# Patient Record
Sex: FEMALE | Race: WHITE
[De-identification: names, ages, dates, MRNs, and addresses within clinical notes are randomized per-mention and may not be internally consistent; named-entity substitution may affect disease eponyms.]

---

## 2017-09-27 ENCOUNTER — HOSPITAL ENCOUNTER (OUTPATIENT)
Dept: HOSPITAL 80 - FIMAGING | Age: 67
End: 2017-09-27
Attending: GENERAL ACUTE CARE HOSPITAL
Payer: COMMERCIAL

## 2017-09-27 DIAGNOSIS — Z12.31: Primary | ICD-10-CM

## 2017-09-27 PROCEDURE — G0202 SCR MAMMO BI INCL CAD: HCPCS

## 2019-01-23 ENCOUNTER — HOSPITAL ENCOUNTER (EMERGENCY)
Dept: HOSPITAL 80 - FED | Age: 69
LOS: 1 days | Discharge: HOME | End: 2019-01-24
Payer: COMMERCIAL

## 2019-01-23 VITALS — SYSTOLIC BLOOD PRESSURE: 123 MMHG | DIASTOLIC BLOOD PRESSURE: 76 MMHG

## 2019-01-23 DIAGNOSIS — S42.211A: Primary | ICD-10-CM
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
## 2019-01-23 PROCEDURE — 99283 EMERGENCY DEPT VISIT LOW MDM: CPT

## 2019-01-23 PROCEDURE — A4565 SLINGS: HCPCS

## 2019-01-23 PROCEDURE — 73060 X-RAY EXAM OF HUMERUS: CPT

## 2019-01-23 NOTE — EDPHY
H & P


Stated Complaint: SLIPPED AND FELL ONTO R ARM


Time Seen by Provider: 01/23/19 22:15


HPI/ROS: 


HPI:  This is a 68-year-old female who presents with





Chief Complaint: SLIPPED AND FELL ONTO R ARM





Location:  Right posterior arm


Quality:  Injury


Duration:  1 hr prior to arrival


Signs and Symptoms:  No bleeding, no radiation, no numbness, no weakness, no 

tingling, no incontinence, + decreased range of motion, no swelling, + pain, no 

fever


Timing:  Acute, constant


Severity:  Moderate


Context:  Patient is right-hand dominant, presents with walking outside over 

home onto the sidewalk and slipping on a patch of ice at the curb.  Patient 

reports that she fell directly on her mid upper arm.  She reports that she felt 

immediate, constant, radiating pain up into her shoulder and down into her 

elbow.  Denies LOC/head injury/neck pain/dizziness/nausea/vomiting/amnesia.  

She was ambulatory at the scene and went back inside and took 2 Tylenol for 

pain relief.  She then drove herself to the emergency room.  Does not take 

blood thinners.


Modifying Factors:  Tylenol.





Comment: 








ROS:  A comprehensive 10 system review of systems is otherwise negative aside 

from elements mentioned in the history of present illness. 








MEDICAL/SURGICAL/SOCIAL HISTORY: 


Medical history:  Generally healthy.  Does not take any regular medications.


Surgical history:  Denies


Social history:  Former smoker.








CONSTITUTIONAL:  Polite and cooperative elderly white female, awake and alert, 

no obvious distress


HEENT: Atraumatic and normocephalic.


NECK: supple, no midline tenderness, flexion 45 degrees, extension 45 degrees, 

right and left lateral flexion 45 degrees. No meningismus.


Cardiovascular: Normal S1/S2, regular rate, regular rhythm, without murmur rub 

or gallop.


PULMONARY/CHEST:  Symmetrical and nontender. no crepitus. Clear to auscultation 

bilaterally. Good air movement. No accessory muscle usage.


ABDOMEN:  Soft, nondistended, nontender, no ecchymosis.


PELVIC: no pain with rocking; bilateral hips flexion 125 degrees, extension 30 

degrees, with no pain internal rotation and no pain external rotation.


BACK:  No midline tenderness, no paraspinous spasm, deep tendon reflexes 2/2, 

no pain with straight leg raise, No foot drop.  Achilles reflexes are equal 

bilaterally.  Able to walk on heels and toes without difficulty.


EXTREMITIES:  2/2 pulses, strength 5/5, right SHOULDER:  Patient keeps her 

right arm with the elbow flexed at 90 across her chest.  She refuses to move 

the arm secondary to pain.  Wrist and fingers have full range of motion.  DIP/

PIP/MCP flexion/extension intact with good light touch sensation. no deformities

, no clubbing, no cyanosis or edema.


NEUROLOGICAL: no focal neuro deficits.  GCS 15.  Light touch sensation intact.


SKIN: Warm and dry, no erythema. no rash.  Good capillary refill.   





Source: Patient


Exam Limitations: No limitations





- Personal History


Current Tetanus/Diphtheria Vaccine: Yes


Current Tetanus Diphtheria and Acellular Pertussis (TDAP): Yes





- Medical/Surgical History


Hx Asthma: No


Hx Chronic Respiratory Disease: No


Hx Diabetes: No


Hx Cardiac Disease: No


Hx Renal Disease: No


Hx Cirrhosis: No


Hx Alcoholism: No


Hx HIV/AIDS: No


Hx Splenectomy or Spleen Trauma: No


Other PMH: DENIES





- Social History


Smoking Status: Former smoker


Constitutional: 


 Initial Vital Signs











Temperature (C)  36.3 C   01/23/19 21:59


 


Heart Rate  78   01/23/19 21:59


 


Respiratory Rate  18   01/23/19 21:59


 


Blood Pressure  133/76 H  01/23/19 21:59


 


O2 Sat (%)  99   01/23/19 21:59








 











O2 Delivery Mode               Room Air














Allergies/Adverse Reactions: 


 





gluten Allergy (Verified 01/23/19 22:01)


 








Home Medications: 














 Medication  Instructions  Recorded


 


SUMAtriptan [Imitrex 50 MG (*)] 25 - 100 mg PO DAILY PRN 07/09/15


 


Zolpidem Tartrate [Ambien 10 mg] 3 mg PO HS 07/09/15


 


oxyCODONE/APAP 5/325 [Percocet 1 - 2 tab PO Q4H PRN #10 tab 01/23/19





5/325 (*)]  














Medical Decision Making





- Diagnostics


Imaging Results: 


 Imaging Impressions





Humerus X-Ray  01/23/19 22:02


Impression: Transverse minimally displaced fracture of the surgical neck of the 

humerus.


 


 











Procedures: 


Procedure:  Splint placement.





A right sling was applied by the Emergency Room technician.  After application 

of the splint I returned and re-examined the patient.  The splint was 

adequately immobilizing the joint and distal to the splint the patient's 

circulation and sensation was intact.





ED Course/Re-evaluation: 


Vital signs reviewed and stable upon arrival.


Right humerus fracture ordered and oxycodone 10 mg given.


Patient reports that she did not hit her head and does not take blood thinners.

  


I suggested a head CT or cervical CT scan and patient says she has no pain and 

did not have an injury.  


Right humerus fracture shows minimally displaced proximal humeral neck fracture


ED decision to consult Orthopedics.  Spoke with Dr. Kirk who advised sling, 

pain control and follow up with him in 2-3 days


Given Percocet prepack.











No signs of neurovascular compromise/tenting of skin/compartment syndrome/

extremities and joints examined above and below area of concern and are 

neurovascularly intact. 











This patient was seen under the supervision of my secondary supervising 

physician.  I evaluated care for this patient independently.  Discussed this 

patient with Dr. Mcdonnell who did not see the patient.  





Differential Diagnosis: 


Shoulder injury differential diagnosis includes but is not limited to clavicle 

fracture, contusion, AC joint separation, rotator cuff injury, labral tear, 

humeral head fracture, sprain, scapula fracture.








- Data Points


Medications Given: 


 








Discontinued Medications





Oxycodone HCl (Oxycodone Ir)  10 mg PO EDNOW ONE


   Stop: 01/23/19 22:38


   Last Admin: 01/23/19 23:18 Dose:  Not Given


Oxycodone/Acetaminophen (Percocet 5/325mg Prepack#4)  1 btl TAKEHOME EDNOW ONE


   Stop: 01/23/19 23:54


   Last Admin: 01/23/19 23:59 Dose:  1 btl








Departure





- Departure


Disposition: Home, Routine, Self-Care


Clinical Impression: 


Closed fracture of neck of right humerus


Qualifiers:


 Encounter type: initial encounter Qualified Code(s): S42.211A - Unspecified 

displaced fracture of surgical neck of right humerus, initial encounter for 

closed fracture





Condition: Good


Instructions:  Oxycodone/Acetaminophen (By mouth), How to Use a Sling (ED), 

Proximal Humerus Fracture (ED)


Additional Instructions: 


Wear the sling while out of bed until seen by Orthopedics.


Take Tylenol 650 mg every 4 hours and/or Ibuprofen 600 mg every 8 hours with 

food as needed for pain. 


Use Percocet every 6 hours as needed for severe/break through pain.


Do not use Tylenol and Percocet concomitantly. 


Apply ice for 30 minutes at a time; 2-3 times per day for the next 1-2 days. 


Follow up with Orthopedics in 5-7 days at which time they will evaluate and 

recommend with you if conservative management versus surgery is indicated. 








Follow-Up:


   Please follow-up as noted above.  Follow-up sooner if your condition worsens 

or if you develop any new problems.  Call as soon as possible for an 

appointment.  Be clear when you call for an appointment that this is an 

Emergency Department follow-up.  Contact the Emergency Department if you have 

trouble arranging follow-up care.     


   Our referrals are not based on your insurance network.  When time allows, 

contact your insurance carrier to verify the referral physician is in your 

plan.  If not, get a referral for an in-.








Referrals: 


Chuy Pereira MD [Medical Doctor] - As per Instructions


Prescriptions: 


oxyCODONE/APAP 5/325 [Percocet 5/325 (*)] 1 - 2 tab PO Q4H PRN #10 tab


 PRN Reason: Pain, Severe

## 2019-03-22 ENCOUNTER — HOSPITAL ENCOUNTER (OUTPATIENT)
Dept: HOSPITAL 80 - FIMAGING | Age: 69
End: 2019-03-22
Attending: ORTHOPAEDIC SURGERY
Payer: COMMERCIAL

## 2019-03-22 DIAGNOSIS — Z78.0: ICD-10-CM

## 2019-03-22 DIAGNOSIS — Z82.62: ICD-10-CM

## 2019-03-22 DIAGNOSIS — M81.0: ICD-10-CM

## 2019-03-22 DIAGNOSIS — Z13.820: Primary | ICD-10-CM
